# Patient Record
Sex: FEMALE | Race: WHITE | Employment: PART TIME | URBAN - METROPOLITAN AREA
[De-identification: names, ages, dates, MRNs, and addresses within clinical notes are randomized per-mention and may not be internally consistent; named-entity substitution may affect disease eponyms.]

---

## 2018-03-16 ENCOUNTER — HOSPITAL ENCOUNTER (INPATIENT)
Age: 23
LOS: 3 days | Discharge: HOME | End: 2018-03-19
Attending: OBSTETRICS & GYNECOLOGY | Admitting: OBSTETRICS & GYNECOLOGY
Payer: COMMERCIAL

## 2018-03-16 ENCOUNTER — ANESTHESIA EVENT (OUTPATIENT)
Dept: LABOR AND DELIVERY | Age: 23
End: 2018-03-16
Payer: COMMERCIAL

## 2018-03-16 ENCOUNTER — ANESTHESIA (OUTPATIENT)
Dept: LABOR AND DELIVERY | Age: 23
End: 2018-03-16
Payer: COMMERCIAL

## 2018-03-16 VITALS
TEMPERATURE: 96.8 F | OXYGEN SATURATION: 100 % | SYSTOLIC BLOOD PRESSURE: 116 MMHG | RESPIRATION RATE: 16 BRPM | DIASTOLIC BLOOD PRESSURE: 62 MMHG

## 2018-03-16 PROBLEM — I67.1 BRAIN ANEURYSM: Status: ACTIVE | Noted: 2018-03-16

## 2018-03-16 PROBLEM — O26.93 COMPLICATED PREGNANCY, THIRD TRIMESTER: Status: ACTIVE | Noted: 2018-03-16

## 2018-03-16 LAB
ABO/RH: NORMAL
AMPHETAMINE SCREEN, URINE: NOT DETECTED
ANTIBODY SCREEN: NORMAL
BARBITURATE SCREEN URINE: NOT DETECTED
BENZODIAZEPINE SCREEN, URINE: NOT DETECTED
CANNABINOID SCREEN URINE: NOT DETECTED
COCAINE METABOLITE SCREEN URINE: NOT DETECTED
HCT VFR BLD CALC: 35.3 % (ref 34–48)
HEMOGLOBIN: 11.8 G/DL (ref 11.5–15.5)
MCH RBC QN AUTO: 30.7 PG (ref 26–35)
MCHC RBC AUTO-ENTMCNC: 33.4 % (ref 32–34.5)
MCV RBC AUTO: 91.9 FL (ref 80–99.9)
METHADONE SCREEN, URINE: NOT DETECTED
OPIATE SCREEN URINE: NOT DETECTED
PDW BLD-RTO: 13.2 FL (ref 11.5–15)
PHENCYCLIDINE SCREEN URINE: NOT DETECTED
PLATELET # BLD: 166 E9/L (ref 130–450)
PMV BLD AUTO: 11.4 FL (ref 7–12)
PROPOXYPHENE SCREEN: NOT DETECTED
RBC # BLD: 3.84 E12/L (ref 3.5–5.5)
WBC # BLD: 11.1 E9/L (ref 4.5–11.5)

## 2018-03-16 PROCEDURE — 6360000002 HC RX W HCPCS: Performed by: NURSE ANESTHETIST, CERTIFIED REGISTERED

## 2018-03-16 PROCEDURE — 80307 DRUG TEST PRSMV CHEM ANLYZR: CPT

## 2018-03-16 PROCEDURE — 85027 COMPLETE CBC AUTOMATED: CPT

## 2018-03-16 PROCEDURE — 3700000001 HC ADD 15 MINUTES (ANESTHESIA): Performed by: OBSTETRICS & GYNECOLOGY

## 2018-03-16 PROCEDURE — 86900 BLOOD TYPING SEROLOGIC ABO: CPT

## 2018-03-16 PROCEDURE — 6370000000 HC RX 637 (ALT 250 FOR IP): Performed by: OBSTETRICS & GYNECOLOGY

## 2018-03-16 PROCEDURE — 51702 INSERT TEMP BLADDER CATH: CPT

## 2018-03-16 PROCEDURE — 6360000002 HC RX W HCPCS: Performed by: OBSTETRICS & GYNECOLOGY

## 2018-03-16 PROCEDURE — 1220000001 HC SEMI PRIVATE L&D R&B

## 2018-03-16 PROCEDURE — 6360000002 HC RX W HCPCS: Performed by: ANESTHESIOLOGY

## 2018-03-16 PROCEDURE — 36415 COLL VENOUS BLD VENIPUNCTURE: CPT

## 2018-03-16 PROCEDURE — 7100000000 HC PACU RECOVERY - FIRST 15 MIN: Performed by: OBSTETRICS & GYNECOLOGY

## 2018-03-16 PROCEDURE — 2500000003 HC RX 250 WO HCPCS: Performed by: NURSE ANESTHETIST, CERTIFIED REGISTERED

## 2018-03-16 PROCEDURE — 2580000003 HC RX 258: Performed by: OBSTETRICS & GYNECOLOGY

## 2018-03-16 PROCEDURE — 7100000001 HC PACU RECOVERY - ADDTL 15 MIN: Performed by: OBSTETRICS & GYNECOLOGY

## 2018-03-16 PROCEDURE — 3609079900 HC CESAREAN SECTION: Performed by: OBSTETRICS & GYNECOLOGY

## 2018-03-16 PROCEDURE — 86901 BLOOD TYPING SEROLOGIC RH(D): CPT

## 2018-03-16 PROCEDURE — 3700000000 HC ANESTHESIA ATTENDED CARE: Performed by: OBSTETRICS & GYNECOLOGY

## 2018-03-16 PROCEDURE — 86850 RBC ANTIBODY SCREEN: CPT

## 2018-03-16 RX ORDER — TRISODIUM CITRATE DIHYDRATE AND CITRIC ACID MONOHYDRATE 500; 334 MG/5ML; MG/5ML
30 SOLUTION ORAL ONCE
Status: COMPLETED | OUTPATIENT
Start: 2018-03-16 | End: 2018-03-16

## 2018-03-16 RX ORDER — MORPHINE SULFATE 0.5 MG/ML
INJECTION, SOLUTION EPIDURAL; INTRATHECAL; INTRAVENOUS PRN
Status: DISCONTINUED | OUTPATIENT
Start: 2018-03-16 | End: 2018-03-16 | Stop reason: SDUPTHER

## 2018-03-16 RX ORDER — OXYCODONE HYDROCHLORIDE AND ACETAMINOPHEN 5; 325 MG/1; MG/1
2 TABLET ORAL EVERY 4 HOURS PRN
Status: DISCONTINUED | OUTPATIENT
Start: 2018-03-16 | End: 2018-03-19 | Stop reason: HOSPADM

## 2018-03-16 RX ORDER — ONDANSETRON HYDROCHLORIDE 8 MG/1
8 TABLET, FILM COATED ORAL EVERY 8 HOURS PRN
Status: DISCONTINUED | OUTPATIENT
Start: 2018-03-16 | End: 2018-03-19 | Stop reason: HOSPADM

## 2018-03-16 RX ORDER — BUPIVACAINE HYDROCHLORIDE 7.5 MG/ML
INJECTION, SOLUTION INTRASPINAL PRN
Status: DISCONTINUED | OUTPATIENT
Start: 2018-03-16 | End: 2018-03-16 | Stop reason: SDUPTHER

## 2018-03-16 RX ORDER — DIPHENHYDRAMINE HYDROCHLORIDE 50 MG/ML
25 INJECTION INTRAMUSCULAR; INTRAVENOUS EVERY 6 HOURS PRN
Status: DISCONTINUED | OUTPATIENT
Start: 2018-03-16 | End: 2018-03-19 | Stop reason: HOSPADM

## 2018-03-16 RX ORDER — SODIUM CHLORIDE 0.9 % (FLUSH) 0.9 %
10 SYRINGE (ML) INJECTION EVERY 12 HOURS SCHEDULED
Status: DISCONTINUED | OUTPATIENT
Start: 2018-03-16 | End: 2018-03-19 | Stop reason: HOSPADM

## 2018-03-16 RX ORDER — LANOLIN 100 %
OINTMENT (GRAM) TOPICAL
Status: DISCONTINUED | OUTPATIENT
Start: 2018-03-16 | End: 2018-03-19 | Stop reason: HOSPADM

## 2018-03-16 RX ORDER — SODIUM CHLORIDE 0.9 % (FLUSH) 0.9 %
10 SYRINGE (ML) INJECTION EVERY 12 HOURS SCHEDULED
Status: DISCONTINUED | OUTPATIENT
Start: 2018-03-16 | End: 2018-03-16

## 2018-03-16 RX ORDER — SODIUM CHLORIDE 0.9 % (FLUSH) 0.9 %
10 SYRINGE (ML) INJECTION PRN
Status: DISCONTINUED | OUTPATIENT
Start: 2018-03-16 | End: 2018-03-16

## 2018-03-16 RX ORDER — METHYLERGONOVINE MALEATE 0.2 MG/ML
200 INJECTION INTRAVENOUS PRN
Status: DISCONTINUED | OUTPATIENT
Start: 2018-03-16 | End: 2018-03-19 | Stop reason: HOSPADM

## 2018-03-16 RX ORDER — ONDANSETRON 2 MG/ML
4 INJECTION INTRAMUSCULAR; INTRAVENOUS EVERY 6 HOURS PRN
Status: DISCONTINUED | OUTPATIENT
Start: 2018-03-16 | End: 2018-03-16

## 2018-03-16 RX ORDER — IBUPROFEN 800 MG/1
800 TABLET ORAL EVERY 6 HOURS PRN
Status: DISCONTINUED | OUTPATIENT
Start: 2018-03-16 | End: 2018-03-19 | Stop reason: HOSPADM

## 2018-03-16 RX ORDER — FERROUS SULFATE 325(65) MG
325 TABLET ORAL 2 TIMES DAILY WITH MEALS
Status: DISCONTINUED | OUTPATIENT
Start: 2018-03-17 | End: 2018-03-19 | Stop reason: HOSPADM

## 2018-03-16 RX ORDER — ONDANSETRON 2 MG/ML
4 INJECTION INTRAMUSCULAR; INTRAVENOUS EVERY 6 HOURS PRN
Status: DISCONTINUED | OUTPATIENT
Start: 2018-03-16 | End: 2018-03-19 | Stop reason: HOSPADM

## 2018-03-16 RX ORDER — NALBUPHINE HCL 10 MG/ML
5 AMPUL (ML) INJECTION EVERY 4 HOURS PRN
Status: DISCONTINUED | OUTPATIENT
Start: 2018-03-16 | End: 2018-03-19 | Stop reason: HOSPADM

## 2018-03-16 RX ORDER — OXYCODONE HYDROCHLORIDE AND ACETAMINOPHEN 5; 325 MG/1; MG/1
1 TABLET ORAL EVERY 4 HOURS PRN
Status: DISCONTINUED | OUTPATIENT
Start: 2018-03-16 | End: 2018-03-19 | Stop reason: HOSPADM

## 2018-03-16 RX ORDER — SODIUM CHLORIDE, SODIUM LACTATE, POTASSIUM CHLORIDE, CALCIUM CHLORIDE 600; 310; 30; 20 MG/100ML; MG/100ML; MG/100ML; MG/100ML
INJECTION, SOLUTION INTRAVENOUS CONTINUOUS
Status: DISCONTINUED | OUTPATIENT
Start: 2018-03-16 | End: 2018-03-19 | Stop reason: HOSPADM

## 2018-03-16 RX ORDER — MORPHINE SULFATE 1 MG/ML
INJECTION, SOLUTION EPIDURAL; INTRATHECAL; INTRAVENOUS PRN
Status: DISCONTINUED | OUTPATIENT
Start: 2018-03-16 | End: 2018-03-16 | Stop reason: SDUPTHER

## 2018-03-16 RX ORDER — NALOXONE HYDROCHLORIDE 0.4 MG/ML
0.4 INJECTION, SOLUTION INTRAMUSCULAR; INTRAVENOUS; SUBCUTANEOUS PRN
Status: DISCONTINUED | OUTPATIENT
Start: 2018-03-16 | End: 2018-03-19 | Stop reason: HOSPADM

## 2018-03-16 RX ORDER — KETOROLAC TROMETHAMINE 30 MG/ML
30 INJECTION, SOLUTION INTRAMUSCULAR; INTRAVENOUS EVERY 6 HOURS
Status: ACTIVE | OUTPATIENT
Start: 2018-03-16 | End: 2018-03-17

## 2018-03-16 RX ORDER — KETOROLAC TROMETHAMINE 30 MG/ML
INJECTION, SOLUTION INTRAMUSCULAR; INTRAVENOUS
Status: DISPENSED
Start: 2018-03-16 | End: 2018-03-16

## 2018-03-16 RX ORDER — SODIUM CHLORIDE, SODIUM LACTATE, POTASSIUM CHLORIDE, CALCIUM CHLORIDE 600; 310; 30; 20 MG/100ML; MG/100ML; MG/100ML; MG/100ML
INJECTION, SOLUTION INTRAVENOUS CONTINUOUS
Status: DISCONTINUED | OUTPATIENT
Start: 2018-03-16 | End: 2018-03-16

## 2018-03-16 RX ORDER — DOCUSATE SODIUM 100 MG/1
100 CAPSULE, LIQUID FILLED ORAL 2 TIMES DAILY
Status: DISCONTINUED | OUTPATIENT
Start: 2018-03-16 | End: 2018-03-19 | Stop reason: HOSPADM

## 2018-03-16 RX ORDER — SODIUM CHLORIDE 0.9 % (FLUSH) 0.9 %
10 SYRINGE (ML) INJECTION PRN
Status: DISCONTINUED | OUTPATIENT
Start: 2018-03-16 | End: 2018-03-19 | Stop reason: HOSPADM

## 2018-03-16 RX ORDER — ONDANSETRON 2 MG/ML
INJECTION INTRAMUSCULAR; INTRAVENOUS PRN
Status: DISCONTINUED | OUTPATIENT
Start: 2018-03-16 | End: 2018-03-16 | Stop reason: SDUPTHER

## 2018-03-16 RX ADMIN — SODIUM CHLORIDE, POTASSIUM CHLORIDE, SODIUM LACTATE AND CALCIUM CHLORIDE: 600; 310; 30; 20 INJECTION, SOLUTION INTRAVENOUS at 07:50

## 2018-03-16 RX ADMIN — Medication 500 ML: at 09:15

## 2018-03-16 RX ADMIN — PHENYLEPHRINE HYDROCHLORIDE 100 MCG: 10 INJECTION INTRAMUSCULAR; INTRAVENOUS; SUBCUTANEOUS at 09:10

## 2018-03-16 RX ADMIN — SODIUM CITRATE AND CITRIC ACID MONOHYDRATE 30 ML: 500; 334 SOLUTION ORAL at 08:36

## 2018-03-16 RX ADMIN — PHENYLEPHRINE HYDROCHLORIDE 100 MCG: 10 INJECTION INTRAMUSCULAR; INTRAVENOUS; SUBCUTANEOUS at 09:02

## 2018-03-16 RX ADMIN — SODIUM CHLORIDE, POTASSIUM CHLORIDE, SODIUM LACTATE AND CALCIUM CHLORIDE: 600; 310; 30; 20 INJECTION, SOLUTION INTRAVENOUS at 09:00

## 2018-03-16 RX ADMIN — SODIUM CHLORIDE, POTASSIUM CHLORIDE, SODIUM LACTATE AND CALCIUM CHLORIDE: 600; 310; 30; 20 INJECTION, SOLUTION INTRAVENOUS at 09:50

## 2018-03-16 RX ADMIN — Medication 10 ML: at 23:27

## 2018-03-16 RX ADMIN — Medication 50 MILLI-UNITS/MIN: at 10:45

## 2018-03-16 RX ADMIN — PHENYLEPHRINE HYDROCHLORIDE 100 MCG: 10 INJECTION INTRAMUSCULAR; INTRAVENOUS; SUBCUTANEOUS at 09:15

## 2018-03-16 RX ADMIN — NALBUPHINE HYDROCHLORIDE 5 MG: 10 INJECTION, SOLUTION INTRAMUSCULAR; INTRAVENOUS; SUBCUTANEOUS at 12:18

## 2018-03-16 RX ADMIN — ONDANSETRON 4 MG: 2 INJECTION, SOLUTION INTRAMUSCULAR; INTRAVENOUS at 09:15

## 2018-03-16 RX ADMIN — IBUPROFEN 800 MG: 800 TABLET, FILM COATED ORAL at 23:35

## 2018-03-16 RX ADMIN — MORPHINE SULFATE 0.15 MG: 1 INJECTION, SOLUTION EPIDURAL; INTRATHECAL; INTRAVENOUS at 09:00

## 2018-03-16 RX ADMIN — KETOROLAC TROMETHAMINE 30 MG: 30 INJECTION, SOLUTION INTRAMUSCULAR at 10:40

## 2018-03-16 RX ADMIN — SODIUM CHLORIDE, POTASSIUM CHLORIDE, SODIUM LACTATE AND CALCIUM CHLORIDE: 600; 310; 30; 20 INJECTION, SOLUTION INTRAVENOUS at 08:47

## 2018-03-16 RX ADMIN — Medication 999 MILLI-UNITS/MIN: at 09:14

## 2018-03-16 RX ADMIN — CEFOXITIN SODIUM 2 G: 2 INJECTION, SOLUTION INTRAVENOUS at 08:50

## 2018-03-16 RX ADMIN — PHENYLEPHRINE HYDROCHLORIDE 100 MCG: 10 INJECTION INTRAMUSCULAR; INTRAVENOUS; SUBCUTANEOUS at 09:20

## 2018-03-16 RX ADMIN — SODIUM CHLORIDE, POTASSIUM CHLORIDE, SODIUM LACTATE AND CALCIUM CHLORIDE: 600; 310; 30; 20 INJECTION, SOLUTION INTRAVENOUS at 11:55

## 2018-03-16 RX ADMIN — KETOROLAC TROMETHAMINE 30 MG: 30 INJECTION, SOLUTION INTRAMUSCULAR at 17:17

## 2018-03-16 RX ADMIN — BUPIVACAINE HYDROCHLORIDE IN DEXTROSE 1.6 ML: 7.5 INJECTION, SOLUTION SUBARACHNOID at 09:00

## 2018-03-16 ASSESSMENT — PULMONARY FUNCTION TESTS
PIF_VALUE: 0

## 2018-03-16 ASSESSMENT — PAIN SCALES - GENERAL
PAINLEVEL_OUTOF10: 6
PAINLEVEL_OUTOF10: 4
PAINLEVEL_OUTOF10: 0
PAINLEVEL_OUTOF10: 0
PAINLEVEL_OUTOF10: 4
PAINLEVEL_OUTOF10: 4

## 2018-03-16 ASSESSMENT — PAIN - FUNCTIONAL ASSESSMENT: PAIN_FUNCTIONAL_ASSESSMENT: 0-10

## 2018-03-16 NOTE — PROGRESS NOTES
Ania Alvarez is a 25year old , EDC 2018 39 weeks. She presented ambulatory at [facility] for a scheduled 0900 c/s. Patient denies any vaginal bleeding or leakage of fluid and reports normal fetal movement. Patient taken to LDR 7 EFM applied and plan of care discussed .

## 2018-03-16 NOTE — PROGRESS NOTES
Pt breastfeeding infant at this time, good latch observed, encouraged pt that infant had good latch. Instructed to call if needs additional assistance.

## 2018-03-16 NOTE — ANESTHESIA PRE PROCEDURE
Department of Anesthesiology  Preprocedure Note       Name:  Katie Fink   Age:  25 y.o.  :  1995                                          MRN:  [id number]         Date:  3/16/2018      Surgeon: Zoltan Barraza):  Delma Meehan MD    Procedure: Procedure(s):   SECTION    Medications prior to admission:   Prior to Admission medications    Medication Sig Start Date End Date Taking? Authorizing Provider   Prenatal Vit-Fe Fumarate-FA (PRENATAL VITAMIN PO) Take 1 tablet by mouth daily   Yes Historical Provider, MD   citalopram (CELEXA) 20 MG tablet Take 20 mg by mouth daily. Historical Provider, MD   ibuprofen (ADVIL;MOTRIN) 600 MG tablet Take 1 tablet by mouth every 6 hours as needed for Pain. 14   Alanna Bonner DO       Current medications:    Current Facility-Administered Medications   Medication Dose Route Frequency Provider Last Rate Last Dose    lactated ringers infusion   Intravenous Continuous Alphonse Carmichael  mL/hr at 18 0750      sodium chloride flush 0.9 % injection 10 mL  10 mL Intravenous 2 times per day Alphonse Carmichael MD        sodium chloride flush 0.9 % injection 10 mL  10 mL Intravenous PRN Alphonse Carmichael MD        citric acid-sodium citrate (BICITRA) solution 30 mL  30 mL Oral Once Alphonse Carmichael MD        cefOXitin in dextrose (MEFOXIN) IVPB Duplex 2 g  2 g Intravenous On Call to OR Alphonse Carmichael MD        ondansetron (ZOFRAN) injection 4 mg  4 mg Intravenous Q6H PRN Alphonse Carmichael MD           Allergies:     Allergies   Allergen Reactions    Bee Venom Swelling       Problem List:    Patient Active Problem List   Diagnosis Code    Complicated pregnancy, third trimester O26.93       Past Medical History:        Diagnosis Date    Brain aneurysm 2013    Mood disorder ([facility] [address]     medication as a teenager       Past Surgical History:        Procedure Laterality Date    ADENOIDECTOMY      MYRINGOTOMY AND TYMPANOSTOMY TUBE PLACEMENT Social History:    Social History   Substance Use Topics    Smoking status: Former Smoker    Smokeless tobacco: Never Used      Comment: 2013    Alcohol use Yes      Comment: socially                                Counseling given: Not Answered      Vital Signs (Current):   Vitals:    03/16/18 0530 03/16/18 0543 03/16/18 0740   BP:  108/70 129/74   Pulse:  62 56   Resp:  16 16   Temp:  98.6 °F (37 °C) 98.4 °F (36.9 °C)   TempSrc:  Oral Oral   Weight: 158 lb (71.7 kg)     Height: 5' 4\" (1.626 m)                                                BP Readings from Last 3 Encounters:   03/16/18 129/74   09/14/14 118/63       NPO Status: Time of last liquid consumption: 2330                        Time of last solid consumption: 2100                        Date of last liquid consumption: 03/16/18                        Date of last solid food consumption: 03/16/18    BMI:   Wt Readings from Last 3 Encounters:   03/16/18 158 lb (71.7 kg)   09/14/14 128 lb (58.1 kg) (53 %, Z= 0.06)*     * Growth percentiles are based on CDC 2-20 Years data. Body mass index is 27.12 kg/m². CBC:   Lab Results   Component Value Date    WBC 11.1 03/16/2018    RBC 3.84 03/16/2018    HGB 11.8 03/16/2018    HCT 35.3 03/16/2018    MCV 91.9 03/16/2018    RDW 13.2 03/16/2018     03/16/2018       CMP:   Lab Results   Component Value Date    GLUCOSE 93 12/08/2017       POC Tests: No results for input(s): POCGLU, POCNA, POCK, POCCL, POCBUN, POCHEMO, POCHCT in the last 72 hours.     Coags: No results found for: PROTIME, INR, APTT    HCG (If Applicable): No results found for: PREGTESTUR, PREGSERUM, HCG, HCGQUANT     ABGs: No results found for: PHART, PO2ART, OYA1MJM, BVO2VEX, BEART, I2WZXPTB     Type & Screen (If Applicable):  No results found for: LABABO, [address]    Anesthesia Evaluation  Patient summary reviewed no history of anesthetic complications:   Airway: Mallampati: II  TM distance: >3 FB   Neck ROM: full   Dental: normal exam Pulmonary: breath sounds clear to auscultation                             Cardiovascular:Negative CV ROS  Exercise tolerance: good (>4 METS),           Rhythm: regular  Rate: normal                    Neuro/Psych:   (+) psychiatric history:            GI/Hepatic/Renal: Neg GI/Hepatic/Renal ROS            Endo/Other: Negative Endo/Other ROS                    Abdominal:           Vascular:                                    Anesthesia Plan      spinal     ASA 2     (H/o small brain aneurysm -> elective  with spinal to avoid need to push)  Induction: intravenous. Anesthetic plan and risks discussed with patient. Plan discussed with CRNA.                 Keagan Pelletier MD   3/16/2018

## 2018-03-16 NOTE — L&D DELIVERY NOTE
to Skin    Skin to skin initiation date/time: 3/16/18 0915   Skin to skin with:   Mother  Skin to skin end date/time: 3/16/18 0930         Measurements    Weight:  3147 g  Length:  45.7 cm  Head circumference:  34 cm  Chest circumference:  33.5 cm  Abdominal girth:  33 cm     Delivery Information    Surgical or additional est. blood loss (mL):  500 (View Only):  Edit in Flowsheets   Combined est. blood loss (mL):  500

## 2018-03-16 NOTE — H&P
Subjective:      Kirsten Herron is an 25 y.o. female at 44 and 0/7 weeks gestation presenting with   Chief Complaint   Patient presents with    Scheduled    due to a brain aneurysm and the need to avoid pushing in the second stage of labor. She is also complaining of contractions every a few minutes lasting few seconds. Other associated symptoms include low back pain. Fetal Movement: normal.  Past Medical History:   Diagnosis Date    Brain aneurysm 2013    Mood disorder ([address]     medication as a teenager       Review of Systems  Pertinent items are noted in HPI. Objective:      /74   Pulse 56   Temp 98.4 °F (36.9 °C) (Oral)   Resp 16   Ht 5' 4\" (1.626 m)   Wt 158 lb (71.7 kg)   LMP 2017   BMI 27.12 kg/m²   General:   alert, appears stated age and cooperative   Cervix:   closed. FHT:   140 BPM     Lab Review    CBC:   Lab Results   Component Value Date    WBC 11.1 2018    RBC 3.84 2018    HGB 11.8 2018    HCT 35.3 2018    MCV 91.9 2018    MCH 30.7 2018    MCHC 33.4 2018    RDW 13.2 2018     2018    MPV 11.4 2018     CMP:    Lab Results   Component Value Date    GLUCOSE 93 2017     U/A:  No results found for: NITRITE, COLORU, PHUR, LABCAST, WBCUA, RBCUA, MUCUS, TRICHOMONAS, YEAST, BACTERIA, CLARITYU, SPECGRAV, LEUKOCYTESUR, UROBILINOGEN, BILIRUBINUR, BLOODU, GLUCOSEU, AMORPHOUS       Assessment:      full term pregnancy with a brain aneurysm     Plan:      Monitored for contractions - findings: uterine irritability. Orders: primary c/s.      Amine DICK Carmichael MD,3/16/2018 8:50 AM

## 2018-03-16 NOTE — OP NOTE
PATIENT:    Ester Gonsalez    PREOPERATIVE DIAGNOSES:  1.  39w0d        2.brain aneurysm     POSTOPERATIVE DIAGNOSES:  Same      PROCEDURE:     Primary low transverse  section.      SURGEON:     Alphonse Carmichael MD      ESTIMATED BLOOD LOSS:   841 mL.      COMPLICATIONS:     None.         ANESTHESIA:    Spinal.         FINDINGS:   Live male         Infant Apgars 9 and 9 at 1 and 5 min respectively. Weight: 6 lbs 15 oz.        Normal tubes and ovaries bilaterally.         DETAILS OF PROCEDURE:   With the patient in the sitting position, spinal anesthesia was given without any complications. She was then placed in the supine position slightly tilted to the left. Abdomen was scrubbed and draped in the usual manner. Anesthesia level was checked and was found to be adequate. The abdomen was entered thru a transverse incision The Bovie was used to go through the subcutaneous tissue. The fascia was entered in the same plane as the skin incision and  from the underlying rectus abdominis muscles which were  in the midline exposing the parietal peritoneum. The peritoneum was opened sharply in a longitudinal fashion. A bladder retractor was placed in position and the visceral peritoneum overlying the lower uterine segment was opened transversely and a bladder flap was developed in a sharp manner. A Mobius OB retractor was placed in position. The lower uterine segment was opened in a low transverse fashion. The amniotic cavity was entered and Clear amniotic fluid was suctioned out. The baby was then delivered from the Cephalic position without any complications. The baby was handed over to the nursery nurse. Cord blood was saved. The placenta was then removed manually and the endometrial cavity was swept clean by a wet lap. The edges of the uterine incision were grasped by Allis clamps and the incision itself was closed using a continuous locked suture of 0 monocryl.  Tubes and ovaries were

## 2018-03-17 LAB
HCT VFR BLD CALC: 28.9 % (ref 34–48)
HEMOGLOBIN: 9.8 G/DL (ref 11.5–15.5)

## 2018-03-17 PROCEDURE — 1220000001 HC SEMI PRIVATE L&D R&B

## 2018-03-17 PROCEDURE — 85014 HEMATOCRIT: CPT

## 2018-03-17 PROCEDURE — 6370000000 HC RX 637 (ALT 250 FOR IP): Performed by: OBSTETRICS & GYNECOLOGY

## 2018-03-17 PROCEDURE — 85018 HEMOGLOBIN: CPT

## 2018-03-17 PROCEDURE — 36415 COLL VENOUS BLD VENIPUNCTURE: CPT

## 2018-03-17 RX ADMIN — OXYCODONE HYDROCHLORIDE AND ACETAMINOPHEN 2 TABLET: 5; 325 TABLET ORAL at 10:27

## 2018-03-17 RX ADMIN — FERROUS SULFATE TAB 325 MG (65 MG ELEMENTAL FE) 325 MG: 325 (65 FE) TAB at 08:31

## 2018-03-17 RX ADMIN — OXYCODONE HYDROCHLORIDE AND ACETAMINOPHEN 1 TABLET: 5; 325 TABLET ORAL at 05:40

## 2018-03-17 RX ADMIN — DOCUSATE SODIUM 100 MG: 100 CAPSULE, LIQUID FILLED ORAL at 22:23

## 2018-03-17 RX ADMIN — DOCUSATE SODIUM 100 MG: 100 CAPSULE, LIQUID FILLED ORAL at 08:31

## 2018-03-17 RX ADMIN — IBUPROFEN 800 MG: 800 TABLET, FILM COATED ORAL at 08:35

## 2018-03-17 RX ADMIN — OXYCODONE HYDROCHLORIDE AND ACETAMINOPHEN 2 TABLET: 5; 325 TABLET ORAL at 22:33

## 2018-03-17 RX ADMIN — IBUPROFEN 800 MG: 800 TABLET, FILM COATED ORAL at 18:51

## 2018-03-17 RX ADMIN — FERROUS SULFATE TAB 325 MG (65 MG ELEMENTAL FE) 325 MG: 325 (65 FE) TAB at 18:52

## 2018-03-17 RX ADMIN — OXYCODONE HYDROCHLORIDE AND ACETAMINOPHEN 2 TABLET: 5; 325 TABLET ORAL at 14:31

## 2018-03-17 ASSESSMENT — PAIN SCALES - GENERAL
PAINLEVEL_OUTOF10: 7
PAINLEVEL_OUTOF10: 2
PAINLEVEL_OUTOF10: 5
PAINLEVEL_OUTOF10: 7
PAINLEVEL_OUTOF10: 7
PAINLEVEL_OUTOF10: 3
PAINLEVEL_OUTOF10: 6
PAINLEVEL_OUTOF10: 5

## 2018-03-17 ASSESSMENT — PAIN - FUNCTIONAL ASSESSMENT: PAIN_FUNCTIONAL_ASSESSMENT: 0-10

## 2018-03-18 PROCEDURE — 1220000001 HC SEMI PRIVATE L&D R&B

## 2018-03-18 PROCEDURE — 6370000000 HC RX 637 (ALT 250 FOR IP): Performed by: OBSTETRICS & GYNECOLOGY

## 2018-03-18 RX ADMIN — OXYCODONE HYDROCHLORIDE AND ACETAMINOPHEN 2 TABLET: 5; 325 TABLET ORAL at 09:36

## 2018-03-18 RX ADMIN — DOCUSATE SODIUM 100 MG: 100 CAPSULE, LIQUID FILLED ORAL at 20:57

## 2018-03-18 RX ADMIN — FERROUS SULFATE TAB 325 MG (65 MG ELEMENTAL FE) 325 MG: 325 (65 FE) TAB at 18:11

## 2018-03-18 RX ADMIN — FERROUS SULFATE TAB 325 MG (65 MG ELEMENTAL FE) 325 MG: 325 (65 FE) TAB at 09:36

## 2018-03-18 RX ADMIN — OXYCODONE HYDROCHLORIDE AND ACETAMINOPHEN 2 TABLET: 5; 325 TABLET ORAL at 23:37

## 2018-03-18 RX ADMIN — OXYCODONE HYDROCHLORIDE AND ACETAMINOPHEN 2 TABLET: 5; 325 TABLET ORAL at 16:40

## 2018-03-18 RX ADMIN — DOCUSATE SODIUM 100 MG: 100 CAPSULE, LIQUID FILLED ORAL at 09:36

## 2018-03-18 ASSESSMENT — PAIN SCALES - GENERAL
PAINLEVEL_OUTOF10: 8
PAINLEVEL_OUTOF10: 3

## 2018-03-18 NOTE — PLAN OF CARE
Problem: Discharge Planning:  Goal: Discharged to appropriate level of care  Discharged to appropriate level of care   Outcome: Not Met This Shift      Problem: Fluid Volume - Imbalance:  Goal: Absence of postpartum hemorrhage signs and symptoms  Absence of postpartum hemorrhage signs and symptoms   Outcome: Met This Shift    Goal: Absence of imbalanced fluid volume signs and symptoms  Absence of imbalanced fluid volume signs and symptoms   Outcome: Met This Shift      Problem: Infection - Surgical Site:  Goal: Will show no infection signs and symptoms  Will show no infection signs and symptoms   Outcome: Met This Shift      Problem: Nausea/Vomiting:  Goal: Absence of nausea/vomiting  Absence of nausea/vomiting   Outcome: Met This Shift

## 2018-03-18 NOTE — PROGRESS NOTES
Assumed care of pt for 11-7 shift. First contact with pt. Plan of care for night discussed. Pt verbalizes understanding. Denies need for pain control at present time. Safe sleep precautions and procedures discussed for baby to sleep in crib for night. Rest encouraged.

## 2018-03-18 NOTE — PROGRESS NOTES
Subjective:     Postpartum Day 2:  Delivery    The patient feels well. The patient denies emotional concerns. Pain is well controlled with current medications. The baby is well. Urinary output is adequate. The patient is  ambulating well. The patient is tolerating a normal diet. Flatus has been passed. Objective:       Vitals:    18 0940   BP: (!) 105/51   Pulse: 75   Resp: 18   Temp: 98.8 °F (37.1 °C)   SpO2:          General:    alert, appears stated age and cooperative   Bowel Sounds:  active   Lochia:  appropriate   Uterine Fundus:   firm   Incision:  healing well, no significant drainage, no dehiscence, no significant erythema   DVT Evaluation:  No evidence of DVT seen on physical exam.     CBC   Lab Results   Component Value Date    WBC 11.1 2018    HGB 9.8 (L) 2018    HCT 28.9 (L) 2018    MCV 91.9 2018     2018        Assessment:     Status post  section. Doing well postoperatively. Plan:     Continue current care.

## 2018-03-19 VITALS
SYSTOLIC BLOOD PRESSURE: 109 MMHG | HEART RATE: 55 BPM | DIASTOLIC BLOOD PRESSURE: 67 MMHG | TEMPERATURE: 98.2 F | RESPIRATION RATE: 18 BRPM | WEIGHT: 158 LBS | OXYGEN SATURATION: 99 % | BODY MASS INDEX: 26.98 KG/M2 | HEIGHT: 64 IN

## 2018-03-19 PROCEDURE — 6360000002 HC RX W HCPCS: Performed by: OBSTETRICS & GYNECOLOGY

## 2018-03-19 PROCEDURE — 90471 IMMUNIZATION ADMIN: CPT | Performed by: OBSTETRICS & GYNECOLOGY

## 2018-03-19 PROCEDURE — 94150 VITAL CAPACITY TEST: CPT

## 2018-03-19 PROCEDURE — 94762 N-INVAS EAR/PLS OXIMTRY CONT: CPT

## 2018-03-19 PROCEDURE — 90715 TDAP VACCINE 7 YRS/> IM: CPT | Performed by: OBSTETRICS & GYNECOLOGY

## 2018-03-19 PROCEDURE — 6370000000 HC RX 637 (ALT 250 FOR IP): Performed by: OBSTETRICS & GYNECOLOGY

## 2018-03-19 RX ORDER — OXYCODONE HYDROCHLORIDE AND ACETAMINOPHEN 5; 325 MG/1; MG/1
1 TABLET ORAL EVERY 4 HOURS PRN
Qty: 20 TABLET | Refills: 0 | Status: SHIPPED | OUTPATIENT
Start: 2018-03-19 | End: 2018-03-26

## 2018-03-19 RX ORDER — IBUPROFEN 800 MG/1
800 TABLET ORAL EVERY 6 HOURS PRN
Qty: 120 TABLET | Refills: 1 | Status: SHIPPED | OUTPATIENT
Start: 2018-03-19

## 2018-03-19 RX ADMIN — OXYCODONE HYDROCHLORIDE AND ACETAMINOPHEN 2 TABLET: 5; 325 TABLET ORAL at 09:01

## 2018-03-19 RX ADMIN — DOCUSATE SODIUM 100 MG: 100 CAPSULE, LIQUID FILLED ORAL at 09:02

## 2018-03-19 RX ADMIN — FERROUS SULFATE TAB 325 MG (65 MG ELEMENTAL FE) 325 MG: 325 (65 FE) TAB at 09:01

## 2018-03-19 RX ADMIN — TETANUS TOXOID, REDUCED DIPHTHERIA TOXOID AND ACELLULAR PERTUSSIS VACCINE, ADSORBED 0.5 ML: 5; 2.5; 8; 8; 2.5 SUSPENSION INTRAMUSCULAR at 09:03

## 2018-03-19 ASSESSMENT — PAIN SCALES - GENERAL: PAINLEVEL_OUTOF10: 8

## 2018-03-19 NOTE — PROGRESS NOTES
Subjective:     Postpartum Day 3:  Delivery    The patient feels well. The patient denies emotional concerns. Pain is well controlled with current medications. The baby is well. Urinary output is adequate. The patient is  ambulating well. The patient is tolerating a normal diet. Flatus has been passed. Objective:       Vitals:    18 2325   BP: 115/67   Pulse: 64   Resp: 16   Temp: 99.1 °F (37.3 °C)   SpO2:          General:    alert, appears stated age and cooperative   Bowel Sounds:  active   Lochia:  appropriate   Uterine Fundus:   firm   Incision:  healing well, no significant drainage, no dehiscence, no significant erythema   DVT Evaluation:  No evidence of DVT seen on physical exam.     CBC   Lab Results   Component Value Date    WBC 11.1 2018    HGB 9.8 (L) 2018    HCT 28.9 (L) 2018    MCV 91.9 2018     2018        Assessment:     Status post  section. Doing well postoperatively. Plan:     Continue current care.

## 2018-07-27 ENCOUNTER — HOSPITAL ENCOUNTER (EMERGENCY)
Age: 23
Discharge: HOME | End: 2018-07-27
Payer: COMMERCIAL

## 2018-07-27 VITALS
RESPIRATION RATE: 14 BRPM | DIASTOLIC BLOOD PRESSURE: 78 MMHG | BODY MASS INDEX: 21.46 KG/M2 | SYSTOLIC BLOOD PRESSURE: 116 MMHG | WEIGHT: 125 LBS | HEART RATE: 118 BPM | OXYGEN SATURATION: 98 % | TEMPERATURE: 98.5 F

## 2018-07-27 DIAGNOSIS — J02.9 ACUTE PHARYNGITIS, UNSPECIFIED ETIOLOGY: Primary | ICD-10-CM

## 2018-07-27 PROCEDURE — 96372 THER/PROPH/DIAG INJ SC/IM: CPT

## 2018-07-27 PROCEDURE — 99213 OFFICE O/P EST LOW 20 MIN: CPT

## 2018-07-27 RX ORDER — NORETHINDRONE ACETATE AND ETHINYL ESTRADIOL 1.5-30(21)
1 KIT ORAL DAILY
COMMUNITY
End: 2018-10-28 | Stop reason: ALTCHOICE

## 2018-07-27 RX ORDER — AMOXICILLIN 500 MG/1
500 CAPSULE ORAL 3 TIMES DAILY
Qty: 30 CAPSULE | Refills: 0 | Status: SHIPPED | OUTPATIENT
Start: 2018-07-27 | End: 2018-08-06

## 2018-07-27 ASSESSMENT — PAIN DESCRIPTION - PAIN TYPE: TYPE: ACUTE PAIN

## 2018-07-27 ASSESSMENT — PAIN DESCRIPTION - FREQUENCY: FREQUENCY: INTERMITTENT

## 2018-07-27 ASSESSMENT — PAIN DESCRIPTION - DESCRIPTORS: DESCRIPTORS: ACHING

## 2018-07-27 ASSESSMENT — PAIN SCALES - GENERAL: PAINLEVEL_OUTOF10: 7

## 2018-07-27 ASSESSMENT — PAIN DESCRIPTION - LOCATION: LOCATION: THROAT

## 2018-10-28 ENCOUNTER — HOSPITAL ENCOUNTER (EMERGENCY)
Age: 23
Discharge: HOME | End: 2018-10-28
Payer: COMMERCIAL

## 2018-10-28 VITALS
TEMPERATURE: 97.4 F | OXYGEN SATURATION: 98 % | SYSTOLIC BLOOD PRESSURE: 106 MMHG | DIASTOLIC BLOOD PRESSURE: 69 MMHG | WEIGHT: 125 LBS | RESPIRATION RATE: 18 BRPM | BODY MASS INDEX: 21.46 KG/M2 | HEART RATE: 81 BPM

## 2018-10-28 DIAGNOSIS — H66.001 ACUTE SUPPURATIVE OTITIS MEDIA OF RIGHT EAR WITHOUT SPONTANEOUS RUPTURE OF TYMPANIC MEMBRANE, RECURRENCE NOT SPECIFIED: Primary | ICD-10-CM

## 2018-10-28 PROCEDURE — 99212 OFFICE O/P EST SF 10 MIN: CPT

## 2018-10-28 RX ORDER — AMOXICILLIN 500 MG/1
500 CAPSULE ORAL 3 TIMES DAILY
Qty: 30 CAPSULE | Refills: 0 | Status: SHIPPED | OUTPATIENT
Start: 2018-10-28 | End: 2018-11-07

## 2018-10-28 ASSESSMENT — PAIN DESCRIPTION - ORIENTATION: ORIENTATION: RIGHT

## 2018-10-28 ASSESSMENT — PAIN DESCRIPTION - DESCRIPTORS: DESCRIPTORS: DISCOMFORT

## 2018-10-28 ASSESSMENT — PAIN DESCRIPTION - PAIN TYPE: TYPE: ACUTE PAIN

## 2018-10-28 ASSESSMENT — PAIN DESCRIPTION - LOCATION: LOCATION: EAR

## 2018-10-28 ASSESSMENT — PAIN SCALES - GENERAL: PAINLEVEL_OUTOF10: 7

## 2018-11-21 ENCOUNTER — HOSPITAL ENCOUNTER (EMERGENCY)
Age: 23
Discharge: HOME | End: 2018-11-21
Payer: COMMERCIAL

## 2018-11-21 VITALS
OXYGEN SATURATION: 95 % | WEIGHT: 125 LBS | BODY MASS INDEX: 21.46 KG/M2 | HEART RATE: 86 BPM | TEMPERATURE: 98.4 F | RESPIRATION RATE: 16 BRPM | DIASTOLIC BLOOD PRESSURE: 79 MMHG | SYSTOLIC BLOOD PRESSURE: 124 MMHG

## 2018-11-21 DIAGNOSIS — N30.01 ACUTE CYSTITIS WITH HEMATURIA: Primary | ICD-10-CM

## 2018-11-21 LAB
BACTERIA: ABNORMAL /HPF
BILIRUBIN URINE: NEGATIVE
BLOOD, URINE: ABNORMAL
CLARITY: ABNORMAL
COLOR: ABNORMAL
GLUCOSE URINE: NEGATIVE MG/DL
HCG(URINE) PREGNANCY TEST: NEGATIVE
KETONES, URINE: 15 MG/DL
LEUKOCYTE ESTERASE, URINE: ABNORMAL
NITRITE, URINE: POSITIVE
PH UA: 7 (ref 5–9)
PROTEIN UA: 30 MG/DL
RBC UA: ABNORMAL /HPF (ref 0–2)
SPECIFIC GRAVITY UA: 1.02 (ref 1–1.03)
UROBILINOGEN, URINE: 0.2 E.U./DL
WBC UA: >20 /HPF (ref 0–5)

## 2018-11-21 PROCEDURE — 81025 URINE PREGNANCY TEST: CPT

## 2018-11-21 PROCEDURE — 99212 OFFICE O/P EST SF 10 MIN: CPT

## 2018-11-21 PROCEDURE — 87088 URINE BACTERIA CULTURE: CPT

## 2018-11-21 PROCEDURE — 87186 SC STD MICRODIL/AGAR DIL: CPT

## 2018-11-21 PROCEDURE — 87077 CULTURE AEROBIC IDENTIFY: CPT

## 2018-11-21 PROCEDURE — 81001 URINALYSIS AUTO W/SCOPE: CPT

## 2018-11-21 RX ORDER — LEVONORGESTREL AND ETHINYL ESTRADIOL 0.15-0.03
1 KIT ORAL DAILY
COMMUNITY

## 2018-11-21 RX ORDER — CEPHALEXIN 500 MG/1
500 CAPSULE ORAL 3 TIMES DAILY
Qty: 21 CAPSULE | Refills: 0 | Status: SHIPPED | OUTPATIENT
Start: 2018-11-21 | End: 2018-11-28

## 2018-11-23 LAB
ORGANISM: ABNORMAL
URINE CULTURE, ROUTINE: ABNORMAL
URINE CULTURE, ROUTINE: ABNORMAL

## 2024-04-12 ENCOUNTER — TELEPHONE (OUTPATIENT)
Dept: ADMINISTRATIVE | Age: 29
End: 2024-04-12

## 2024-04-12 NOTE — TELEPHONE ENCOUNTER
Patient seen at [facility] on 4/9/2024 for recurrent ear infection, left. Stated she is having a hard time hearing from her left ear even after using ear drops. Requesting follow up. Please advise     Patient can be reached at [phone]

## 2024-04-16 NOTE — TELEPHONE ENCOUNTER
Called pt and advised pt to see pcp for referral for her ears. Pt states she does not have a primary care doctor. I told pt to go back to [facility] and request they send over a referral, pt states they gave pt a paper referral. I advised pt to call UC and request they send over the referral to our office. Pt understood.

## (undated) DEVICE — CHLORAPREP 26ML ORANGE

## (undated) DEVICE — TELFA ADHESIVE ISLAND DRESSING: Brand: TELFA

## (undated) DEVICE — TRAY CATH CATH OD16FR 200ML URIN M SIL CNTR ENTRY F

## (undated) DEVICE — RETRACTOR SURG M-L RNG DIA17CM INCIS 15CM ELAS SELF RET BLNT

## (undated) DEVICE — SUTURE STRATAFIX SYMMETRIC SZ 1 L18IN ABSRB VLT CT1 L36CM SXPP1A404

## (undated) DEVICE — BLADE CLIPPER GEN PURP NS

## (undated) DEVICE — 3M™ STERI-STRIP™ ELASTIC SKIN CLOSURES, E4547, 1/2 IN X 4 IN (12 MM X 100 MM), 6 STRIPS/ENVELOPE: Brand: 3M™ STERI-STRIP™

## (undated) DEVICE — STAPLER SKIN SQ 30 ABSRB STPL DISP INSORB

## (undated) DEVICE — TUBE BLD COLLECT ST 1 SIL COAT 7ML 10ML

## (undated) DEVICE — SCALPEL SURG NO21 S STL STR W/ INTEGR MTRC RUL DISP

## (undated) DEVICE — SUTURE VCRL SZ 1 L27IN ABSRB VLT L36MM CT-1 1/2 CIR J341H

## (undated) DEVICE — SLEEVE COMPRESS STD CALF KNEE SCD

## (undated) DEVICE — SUTURE MCRYL SZ 0 L27IN ABSRB VLT CT-1 L36MM 1/2 CIR TAPR Y340H

## (undated) DEVICE — PAD ABD CURITY TENDERSORB 5X9IN

## (undated) DEVICE — DRESSING SUPERABSORBENT W4XL4IN 4 LAYR NONWOVEN FLD

## (undated) DEVICE — MASTISOL ADHESIVE LIQ 2/3ML

## (undated) DEVICE — LINER,SOFT,SUCTION CANISTER,1500CC: Brand: MEDLINE

## (undated) DEVICE — GOWN,SIRUS,FABRNF,XL,20/CS: Brand: MEDLINE

## (undated) DEVICE — SCALPEL SURG NO10 S STL ABS PLAS HNDL DISPOSABLE

## (undated) DEVICE — ELECTRODE PT RET AD L9FT HI MOIST COND ADH HYDRGEL CORDED

## (undated) DEVICE — GLOVE SURG SZ 65 THK91MIL LTX FREE SYN POLYISOPRENE

## (undated) DEVICE — STANDARD HYPODERMIC NEEDLE,POLYPROPYLENE HUB: Brand: MONOJECT

## (undated) DEVICE — GLOVE ORANGE PI 7   MSG9070

## (undated) DEVICE — CESAREAN BIRTH PACK: Brand: MEDLINE INDUSTRIES, INC.

## (undated) DEVICE — GLOVE ORANGE PI 7 1/2   MSG9075